# Patient Record
Sex: MALE | Race: WHITE
[De-identification: names, ages, dates, MRNs, and addresses within clinical notes are randomized per-mention and may not be internally consistent; named-entity substitution may affect disease eponyms.]

---
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
## 2021-01-15 NOTE — ED SKIN/ALLERGY COMPLAINT
History of Present Illness
 
General
Chief Complaint: Skin Rash/ Abcess
Stated Complaint: ABCESS TO LEFT CHEEK
Source: patient, family
Exam Limitations: no limitations
 
Vital Signs & Intake/Output
Vital Signs & Intake/Output
 Vital Signs
 
 
Date Time Temp Pulse Resp B/P B/P Pulse O2 O2 Flow FiO2
 
     Mean Ox Delivery Rate 
 
08/22 1415 97.5 77 20 123/76  97 Room Air  
 
 
 
Allergies
Coded Allergies:
No Known Allergies (11/16/17)
 
Reconcile Medications
Sulfamethoxazole/Trimethoprim (Bactrim Ds Tablet) 800 MG-160 MG TABLET   1 TAB 
PO BID abscess
 
Triage Note:
C/O ABSCESS TO LEFT SIDE OF CHEEK, FOREHEAD AND
SCALP
Triage Nurses Notes Reviewed? yes
Onset: Gradual
Duration: day(s):
Timing: recent history
Severity: moderate
Location: scalp, face
HPI:
19yo male with PMH of lyme disease presents to ED c/o abscess to his left cheek,
forehead, and scalp. Pt reports that the abscesses on his scalp and forehead 
have been present for the past 4 days, while the abscess on his cheek came on 
yesterday. Purulent drainage was noted from each of the abscesses and has 
scabbed over. Cheek abscess has grown in size, tender and painful when pt bites 
down. Pt denies any fevers, chills, sore throat, trouble swallowing. Pt reports 
that he has been recently fishing and camping outside. Denies any insect bites. 
 
(Theresa PONCE,Emily Murrell)
 
Past History
 
Travel History
Traveled to [place] past 21 day No
 
Medical History
Any Pertinent Medical History? see below for history
Neurological: NONE
EENT: NONE
Cardiovascular: NONE
Respiratory: NONE
Gastrointestinal: NONE
Hepatic: NONE
Renal: NONE
Musculoskeletal: NONE
Psychiatric: NONE
Endocrine: NONE
Other Medical Hx:
lyme disease
 
Surgical History
Surgical History: non-contributory
 
Psychosocial History
What is your primary language English
Tobacco Use: Never used
ETOH Use: denies use
Illicit Drug Use: denies illicit drug use
 
Family History
Hx Contributory? No
(Emily Traore)
 
Review of Systems
 
Review of Systems
Constitutional:
Reports: no symptoms. 
EENTM:
Reports: see HPI. 
Respiratory:
Reports: no symptoms. 
Cardiovascular:
Reports: no symptoms. 
GI:
Reports: no symptoms. 
Genitourinary:
Reports: no symptoms. 
Musculoskeletal:
Reports: no symptoms. 
Skin:
Reports: see HPI. 
Neurological/Psychological:
Reports: no symptoms. 
Hematologic/Endocrine:
Reports: no symptoms. 
Immunologic/Allergic:
Reports: no symptoms. 
All Other Systems: Reviewed and Negative
(Emily Traore)
 
Physical Exam
 
Physical Exam
General Appearance: well developed/nourished, no apparent distress, alert, awake
Head: atraumatic, normal appearance, swelling (left cheek), 0.3cm x 2 crusted 
lesions to left forehead and scalp without swelling or active drainage
Eyes:
Bilateral: PERRL, EOMI. 
Ears, Nose, Throat: normal pharynx, normal ENT inspection, hearing grossly 
normal
Neck: normal inspection, supple, full range of motion, no swelling
Back: normal inspection
Extremities: normal inspection, normal range of motion
Neurologic/Psych: awake, alert, oriented x 3, normal mood/affect
Skin: intact, normal color, warm/dry
Skin Problem Location: face, dime sized erythema with central scabbing to 
lateral left cheek with circumferncial induration, central area of fluctuance ; 
no drainage currently tender to palpation , 0.3cm circular erythematous lesion 
on the superior left forehead; 0.3cm circular erythematous lesion on the left 
pariental scalp region no discharge noted
Skin Problem Character: abcess
Lymphatic: no anterior cervical karina
(Emily Traore)
 
Progress
Differential Diagnosis: abscess/cellulitis, allergic reaction, contact 
dermatitis, lyme disease, urticaria
Plan of Care:
 Orders
 
 
Procedure Date/time Status
 
LYME TITRE 08/22 1507 Active
 
HEAD & NECK CULTURE 08/22 1448 Active
 
 
 Laboratory Tests
 
 
08/22/18 1510:
Lyme Disease Antibody Pending
 Microbiology
08/22 1458  HEAD/NECK: Head/Neck Culture - [facility]
08/22 1458  HEAD/NECK: Gram Stain - [facility]
 
I offered the patient and mother consult with plastics given facial abscess 
however they prefer to have I&D performed here in the emergency department.  
Abscess I&D to cheek completed with drainage of purulent material.  Patient 
started on appropriate antibiotics.  He will return in 2 days for wound check.  
Mother also requests Lyme screening given the patient's history of Lyme disease 
several years ago.
(Emily Traore)
 
Departure
 
Departure
Disposition: HOME OR SELF CARE
Condition: Stable
Clinical Impression
Primary Impression: Abscess
Referrals:
Maira FINCH,Demar PARHAM
 
Additional Instructions:
Apply warm compress to the area twice a day to help drain out bacteria.  Take 
antibiotics as prescribed.  Return in 2 days for wound check.  Return sooner if 
Worsening swelling, pain.
 
Please note that there might be incidental findings in your evaluation that are 
unrelated to the current emergency department visit.  Please notify your primary
care doctor about this emergency department visit in order to obtain and review 
all of the testing performed so that these incidental findings can be monitored 
as needed.
 
If you had an x-ray performed, please understand that some fractures may not be 
seen on the initial set of x-rays.  If your symptoms persist you might need a 
repeat set of x-rays to check for such a fracture.
 
If you had a laceration evaluated, please understand that foreign bodies such as
glass or wood may not be visible to the naked eye or on plain x-rays.  If the 
wound becomes red, swollen, increasingly more painful or if there is any 
drainage from the wound, please have it reevaluated by a physician for the 
possibility of a retained foreign body.
 
If you're unable to follow up as outlined in the discharge instructions please 
return to the emergency department.
 
Thank you for choosing the [facility] Emergency Department for your care.
It was a pleasure to serve you today.
Departure Forms:
Customer Survey
General Discharge Information
Prescriptions:
Current Visit Scripts
Sulfamethoxazole/Trimethoprim (Bactrim Ds Tablet) 1 TAB PO BID  
     #20 TAB 
 
 
(Emily Traore)
 
PA/NP Co-Sign Statement
Statement:
ED Attending supervision documentation-
 
[] I saw and evaluated the patient. I have also reviewed all the pertinent lab 
results and diagnostic results. I agree with the findings and the plan of care 
as documented in the PA's/NP's documentation. 
 
[X] I have reviewed the ED Record and agree with the PA's/NP's documentation.
 
[] Additions or exceptions (if any) to the PAs/NP's note and plan are 
summarized below:
[]
 
(Suyapa FINCH,Jesus PARHAM)
 
Procedures
 
Incision and Drainage
Site: left cheek
Blade Size: 11
I & D  Procedure:
Yes: betadine prep.  No: sterile drapes applied, sterile dressing applied, wick 
placed. 
Progress:
1% lidocaine used. Small incision made to scabbed area with immediate area 
material expressed, culture sent to lab.  Area explored using Anette clamps to 
break up loculations however no further purulence expressed.  No wick was 
placed.  Patient tolerated procedure well.
(Theresa PONCE,Emily Murrell) Perseverative/Impaired reasoning